# Patient Record
Sex: MALE | Race: WHITE | Employment: FULL TIME
[De-identification: names, ages, dates, MRNs, and addresses within clinical notes are randomized per-mention and may not be internally consistent; named-entity substitution may affect disease eponyms.]

---

## 2022-03-19 PROBLEM — N39.41 URGE URINARY INCONTINENCE: Status: ACTIVE | Noted: 2018-06-13

## 2022-03-20 PROBLEM — J30.89 ENVIRONMENTAL AND SEASONAL ALLERGIES: Status: ACTIVE | Noted: 2018-06-13

## 2023-05-12 DIAGNOSIS — Z11.4 ENCOUNTER FOR SCREENING FOR HIV: ICD-10-CM

## 2023-05-12 DIAGNOSIS — Z12.5 SPECIAL SCREENING FOR MALIGNANT NEOPLASM OF PROSTATE: ICD-10-CM

## 2023-05-12 DIAGNOSIS — Z00.00 LABORATORY EXAMINATION ORDERED AS PART OF A ROUTINE GENERAL MEDICAL EXAMINATION: ICD-10-CM

## 2023-05-12 DIAGNOSIS — E55.9 VITAMIN D DEFICIENCY: ICD-10-CM

## 2023-05-12 DIAGNOSIS — E78.1 PURE HYPERTRIGLYCERIDEMIA: Primary | ICD-10-CM

## 2023-05-12 DIAGNOSIS — Z11.59 NEED FOR HEPATITIS C SCREENING TEST: ICD-10-CM

## 2023-06-01 ENCOUNTER — NURSE ONLY (OUTPATIENT)
Dept: FAMILY MEDICINE CLINIC | Facility: CLINIC | Age: 48
End: 2023-06-01
Payer: COMMERCIAL

## 2023-06-01 DIAGNOSIS — E55.9 VITAMIN D DEFICIENCY: ICD-10-CM

## 2023-06-01 DIAGNOSIS — Z11.4 ENCOUNTER FOR SCREENING FOR HIV: ICD-10-CM

## 2023-06-01 DIAGNOSIS — Z12.5 SPECIAL SCREENING FOR MALIGNANT NEOPLASM OF PROSTATE: ICD-10-CM

## 2023-06-01 DIAGNOSIS — Z00.00 LABORATORY EXAMINATION ORDERED AS PART OF A ROUTINE GENERAL MEDICAL EXAMINATION: Primary | ICD-10-CM

## 2023-06-01 DIAGNOSIS — Z11.59 NEED FOR HEPATITIS C SCREENING TEST: ICD-10-CM

## 2023-06-01 DIAGNOSIS — E78.1 PURE HYPERTRIGLYCERIDEMIA: ICD-10-CM

## 2023-06-01 LAB
BASOPHILS # BLD: 0.1 K/UL (ref 0–0.2)
BASOPHILS NFR BLD: 2 % (ref 0–2)
BILIRUBIN, URINE, POC: NEGATIVE
BLOOD URINE, POC: NEGATIVE
DIFFERENTIAL METHOD BLD: NORMAL
EOSINOPHIL # BLD: 0.3 K/UL (ref 0–0.8)
EOSINOPHIL NFR BLD: 4 % (ref 0.5–7.8)
ERYTHROCYTE [DISTWIDTH] IN BLOOD BY AUTOMATED COUNT: 12.7 % (ref 11.9–14.6)
GLUCOSE URINE, POC: NEGATIVE
HCT VFR BLD AUTO: 48.9 % (ref 41.1–50.3)
HGB BLD-MCNC: 16.6 G/DL (ref 13.6–17.2)
HIV 1+2 AB+HIV1 P24 AG SERPL QL IA: NONREACTIVE
HIV 1/2 RESULT COMMENT: NORMAL
IMM GRANULOCYTES # BLD AUTO: 0 K/UL (ref 0–0.5)
IMM GRANULOCYTES NFR BLD AUTO: 0 % (ref 0–5)
KETONES, URINE, POC: ABNORMAL
LEUKOCYTE ESTERASE, URINE, POC: NEGATIVE
LYMPHOCYTES # BLD: 2.3 K/UL (ref 0.5–4.6)
LYMPHOCYTES NFR BLD: 33 % (ref 13–44)
MCH RBC QN AUTO: 30 PG (ref 26.1–32.9)
MCHC RBC AUTO-ENTMCNC: 33.9 G/DL (ref 31.4–35)
MCV RBC AUTO: 88.3 FL (ref 82–102)
MONOCYTES # BLD: 0.6 K/UL (ref 0.1–1.3)
MONOCYTES NFR BLD: 9 % (ref 4–12)
NEUTS SEG # BLD: 3.5 K/UL (ref 1.7–8.2)
NEUTS SEG NFR BLD: 52 % (ref 43–78)
NITRITE, URINE, POC: NEGATIVE
NRBC # BLD: 0 K/UL (ref 0–0.2)
PH, URINE, POC: 5.5 (ref 4.6–8)
PLATELET # BLD AUTO: 275 K/UL (ref 150–450)
PMV BLD AUTO: 10.7 FL (ref 9.4–12.3)
PROTEIN,URINE, POC: NEGATIVE
PSA SERPL-MCNC: 0.7 NG/ML
RBC # BLD AUTO: 5.54 M/UL (ref 4.23–5.6)
SPECIFIC GRAVITY, URINE, POC: 1.02 (ref 1–1.03)
URINALYSIS CLARITY, POC: CLEAR
URINALYSIS COLOR, POC: YELLOW
UROBILINOGEN, POC: NORMAL
WBC # BLD AUTO: 6.8 K/UL (ref 4.3–11.1)

## 2023-06-01 PROCEDURE — 81003 URINALYSIS AUTO W/O SCOPE: CPT | Performed by: FAMILY MEDICINE

## 2023-06-02 LAB
25(OH)D3 SERPL-MCNC: 32.6 NG/ML (ref 30–100)
ALBUMIN SERPL-MCNC: 4.2 G/DL (ref 3.5–5)
ALBUMIN/GLOB SERPL: 1.5 (ref 0.4–1.6)
ALP SERPL-CCNC: 82 U/L (ref 50–136)
ALT SERPL-CCNC: 52 U/L (ref 12–65)
ANION GAP SERPL CALC-SCNC: 6 MMOL/L (ref 2–11)
AST SERPL-CCNC: 25 U/L (ref 15–37)
BILIRUB SERPL-MCNC: 0.6 MG/DL (ref 0.2–1.1)
BUN SERPL-MCNC: 22 MG/DL (ref 6–23)
CALCIUM SERPL-MCNC: 9.6 MG/DL (ref 8.3–10.4)
CHLORIDE SERPL-SCNC: 107 MMOL/L (ref 101–110)
CHOLEST SERPL-MCNC: 177 MG/DL
CO2 SERPL-SCNC: 27 MMOL/L (ref 21–32)
CREAT SERPL-MCNC: 0.9 MG/DL (ref 0.8–1.5)
GLOBULIN SER CALC-MCNC: 2.8 G/DL (ref 2.8–4.5)
GLUCOSE SERPL-MCNC: 91 MG/DL (ref 65–100)
HCV AB SER QL: NONREACTIVE
HDLC SERPL-MCNC: 59 MG/DL (ref 40–60)
HDLC SERPL: 3
LDLC SERPL CALC-MCNC: 107.2 MG/DL
POTASSIUM SERPL-SCNC: 4.3 MMOL/L (ref 3.5–5.1)
PROT SERPL-MCNC: 7 G/DL (ref 6.3–8.2)
SODIUM SERPL-SCNC: 140 MMOL/L (ref 133–143)
TRIGL SERPL-MCNC: 54 MG/DL (ref 35–150)
TSH, 3RD GENERATION: 0.64 UIU/ML (ref 0.36–3.74)
VLDLC SERPL CALC-MCNC: 10.8 MG/DL (ref 6–23)

## 2023-06-07 ENCOUNTER — OFFICE VISIT (OUTPATIENT)
Dept: FAMILY MEDICINE CLINIC | Facility: CLINIC | Age: 48
End: 2023-06-07
Payer: COMMERCIAL

## 2023-06-07 VITALS
SYSTOLIC BLOOD PRESSURE: 130 MMHG | BODY MASS INDEX: 23.79 KG/M2 | HEIGHT: 68 IN | DIASTOLIC BLOOD PRESSURE: 80 MMHG | WEIGHT: 157 LBS

## 2023-06-07 DIAGNOSIS — Z13.31 SCREENING FOR DEPRESSION: ICD-10-CM

## 2023-06-07 DIAGNOSIS — Z12.11 SPECIAL SCREENING FOR MALIGNANT NEOPLASMS, COLON: ICD-10-CM

## 2023-06-07 DIAGNOSIS — Z00.00 ROUTINE GENERAL MEDICAL EXAMINATION AT A HEALTH CARE FACILITY: Primary | ICD-10-CM

## 2023-06-07 DIAGNOSIS — G47.01 INSOMNIA DUE TO MEDICAL CONDITION: ICD-10-CM

## 2023-06-07 DIAGNOSIS — M19.90 ARTHRITIS: ICD-10-CM

## 2023-06-07 DIAGNOSIS — L91.8 SKIN TAG: ICD-10-CM

## 2023-06-07 PROCEDURE — 99396 PREV VISIT EST AGE 40-64: CPT | Performed by: FAMILY MEDICINE

## 2023-06-07 PROCEDURE — 11200 RMVL SKIN TAGS UP TO&INC 15: CPT | Performed by: FAMILY MEDICINE

## 2023-06-07 RX ORDER — CELECOXIB 200 MG/1
200 CAPSULE ORAL DAILY
Qty: 90 CAPSULE | Refills: 3 | Status: SHIPPED | OUTPATIENT
Start: 2023-06-07

## 2023-06-07 RX ORDER — AMITRIPTYLINE HYDROCHLORIDE 25 MG/1
25 TABLET, FILM COATED ORAL NIGHTLY
Qty: 90 TABLET | Refills: 3 | Status: SHIPPED | OUTPATIENT
Start: 2023-06-07

## 2023-06-07 SDOH — ECONOMIC STABILITY: FOOD INSECURITY: WITHIN THE PAST 12 MONTHS, YOU WORRIED THAT YOUR FOOD WOULD RUN OUT BEFORE YOU GOT MONEY TO BUY MORE.: NEVER TRUE

## 2023-06-07 SDOH — ECONOMIC STABILITY: INCOME INSECURITY: HOW HARD IS IT FOR YOU TO PAY FOR THE VERY BASICS LIKE FOOD, HOUSING, MEDICAL CARE, AND HEATING?: NOT HARD AT ALL

## 2023-06-07 SDOH — ECONOMIC STABILITY: FOOD INSECURITY: WITHIN THE PAST 12 MONTHS, THE FOOD YOU BOUGHT JUST DIDN'T LAST AND YOU DIDN'T HAVE MONEY TO GET MORE.: NEVER TRUE

## 2023-06-07 SDOH — ECONOMIC STABILITY: HOUSING INSECURITY
IN THE LAST 12 MONTHS, WAS THERE A TIME WHEN YOU DID NOT HAVE A STEADY PLACE TO SLEEP OR SLEPT IN A SHELTER (INCLUDING NOW)?: NO

## 2023-06-07 ASSESSMENT — PATIENT HEALTH QUESTIONNAIRE - PHQ9
SUM OF ALL RESPONSES TO PHQ QUESTIONS 1-9: 8
3. TROUBLE FALLING OR STAYING ASLEEP: 1
SUM OF ALL RESPONSES TO PHQ QUESTIONS 1-9: 8
6. FEELING BAD ABOUT YOURSELF - OR THAT YOU ARE A FAILURE OR HAVE LET YOURSELF OR YOUR FAMILY DOWN: 1
4. FEELING TIRED OR HAVING LITTLE ENERGY: 2
10. IF YOU CHECKED OFF ANY PROBLEMS, HOW DIFFICULT HAVE THESE PROBLEMS MADE IT FOR YOU TO DO YOUR WORK, TAKE CARE OF THINGS AT HOME, OR GET ALONG WITH OTHER PEOPLE: 1
SUM OF ALL RESPONSES TO PHQ QUESTIONS 1-9: 8
5. POOR APPETITE OR OVEREATING: 0
1. LITTLE INTEREST OR PLEASURE IN DOING THINGS: 1
2. FEELING DOWN, DEPRESSED OR HOPELESS: 2
SUM OF ALL RESPONSES TO PHQ9 QUESTIONS 1 & 2: 3
SUM OF ALL RESPONSES TO PHQ QUESTIONS 1-9: 8
9. THOUGHTS THAT YOU WOULD BE BETTER OFF DEAD, OR OF HURTING YOURSELF: 0
7. TROUBLE CONCENTRATING ON THINGS, SUCH AS READING THE NEWSPAPER OR WATCHING TELEVISION: 1
8. MOVING OR SPEAKING SO SLOWLY THAT OTHER PEOPLE COULD HAVE NOTICED. OR THE OPPOSITE, BEING SO FIGETY OR RESTLESS THAT YOU HAVE BEEN MOVING AROUND A LOT MORE THAN USUAL: 0

## 2023-06-07 ASSESSMENT — ENCOUNTER SYMPTOMS
ROS SKIN COMMENTS: SKIN TAG
WHEEZING: 0
DIARRHEA: 0
COUGH: 0
VOMITING: 0
CONSTIPATION: 0
SORE THROAT: 0
SHORTNESS OF BREATH: 0
NAUSEA: 0
ABDOMINAL PAIN: 0

## 2023-06-07 NOTE — PROGRESS NOTES
C-Reactive Protein [id number] Custom]   - Future Order    Uric Acid [10598 Custom]   - Future Order    Rheumatoid Factor [20554 Custom]   - Future Order    CCP Antibodies, IGG/IGA [09967 Custom]      JUNIOR, Direct, w/Reflex [51648 CPT(R)]      Sedimentation Rate [64084 Custom]      C-Reactive Protein [89505 Custom]      Uric Acid [27660 Custom]      Rheumatoid Factor [66358 Custom]      05384 - Venipuncture [41121 CPT(R)]           Special screening for malignant neoplasms, colon        [facility] - Colonoscopy [SLP479 Custom]           Screening for depression             Skin tag        98541 - OR DESTRUCTION BENIGN LESIONS UP TO 14 [03769 CPT(R)]                       Diagnosis Orders   1. Routine general medical examination at a health care facility        2. Insomnia due to medical condition  amitriptyline (ELAVIL) 25 MG tablet      3. Arthritis  celecoxib (CELEBREX) 200 MG capsule    Sedimentation Rate    CCP Antibodies, IGG/IGA    JUNIOR, Direct, w/Reflex    C-Reactive Protein    Uric Acid    Rheumatoid Factor    CCP Antibodies, IGG/IGA    JUNIOR, Direct, w/Reflex    Sedimentation Rate    C-Reactive Protein    Uric Acid    Rheumatoid Factor    05116 - Venipuncture      4. Special screening for malignant neoplasms, colon  [address] - Colonoscopy      5. Screening for depression        6. Skin tag  18399 - OR DESTRUCTION BENIGN LESIONS UP TO 14      , Serafina Koyanagi was seen today for annual exam and discuss labs. Diagnoses and all orders for this visit:    Routine general medical examination at a health care facility    Insomnia due to medical condition  -     amitriptyline (ELAVIL) 25 MG tablet; Take 1 tablet by mouth nightly TAKE 1-2 TABLETS AT BEDTIME    Arthritis  -     celecoxib (CELEBREX) 200 MG capsule; Take 1 capsule by mouth daily  -     Sedimentation Rate; Future  -     CCP Antibodies, IGG/IGA; Future  -     JUNIOR, Direct, w/Reflex;  Future  -     C-Reactive Protein;

## 2023-06-08 LAB
CCP IGA+IGG SERPL IA-ACNC: 2 UNITS (ref 0–19)
CRP SERPL-MCNC: <0.3 MG/DL (ref 0–0.9)
ERYTHROCYTE [SEDIMENTATION RATE] IN BLOOD: <1 MM/HR (ref 0–20)
RHEUMATOID FACT SER QL LA: NEGATIVE
URATE SERPL-MCNC: 5.2 MG/DL (ref 2.6–6)

## 2023-06-09 LAB
ANA SER QL: POSITIVE
CENTROMERE B AB SER-ACNC: <0.2 AI (ref 0–0.9)
CHROMATIN AB SERPL-ACNC: <0.2 AI (ref 0–0.9)
DSDNA AB SER-ACNC: <1 IU/ML (ref 0–9)
ENA JO1 AB SER-ACNC: <0.2 AI (ref 0–0.9)
ENA RNP AB SER-ACNC: 1 AI (ref 0–0.9)
ENA SCL70 AB SER-ACNC: <0.2 AI (ref 0–0.9)
ENA SM AB SER-ACNC: <0.2 AI (ref 0–0.9)
ENA SS-A AB SER-ACNC: <0.2 AI (ref 0–0.9)
ENA SS-B AB SER-ACNC: <0.2 AI (ref 0–0.9)
Lab: ABNORMAL

## 2023-06-27 ENCOUNTER — TELEMEDICINE (OUTPATIENT)
Dept: FAMILY MEDICINE CLINIC | Facility: CLINIC | Age: 48
End: 2023-06-27
Payer: COMMERCIAL

## 2023-06-27 DIAGNOSIS — R53.83 OTHER FATIGUE: ICD-10-CM

## 2023-06-27 DIAGNOSIS — M19.90 ARTHRITIS: Primary | ICD-10-CM

## 2023-06-27 PROCEDURE — 99443 PR PHYS/QHP TELEPHONE EVALUATION 21-30 MIN: CPT | Performed by: FAMILY MEDICINE

## 2023-06-27 ASSESSMENT — PATIENT HEALTH QUESTIONNAIRE - PHQ9
1. LITTLE INTEREST OR PLEASURE IN DOING THINGS: 0
SUM OF ALL RESPONSES TO PHQ QUESTIONS 1-9: 0
SUM OF ALL RESPONSES TO PHQ QUESTIONS 1-9: 0
SUM OF ALL RESPONSES TO PHQ9 QUESTIONS 1 & 2: 0
2. FEELING DOWN, DEPRESSED OR HOPELESS: 0
SUM OF ALL RESPONSES TO PHQ QUESTIONS 1-9: 0
SUM OF ALL RESPONSES TO PHQ QUESTIONS 1-9: 0

## 2023-06-27 ASSESSMENT — ENCOUNTER SYMPTOMS
NAUSEA: 0
SHORTNESS OF BREATH: 0
VOMITING: 0

## 2023-07-05 DIAGNOSIS — R53.83 OTHER FATIGUE: ICD-10-CM

## 2023-07-09 LAB
TESTOST FREE SERPL-MCNC: 4.4 PG/ML (ref 6.8–21.5)
TESTOST SERPL-MCNC: 243 NG/DL (ref 264–916)

## 2023-07-17 ENCOUNTER — TELEMEDICINE (OUTPATIENT)
Dept: FAMILY MEDICINE CLINIC | Facility: CLINIC | Age: 48
End: 2023-07-17
Payer: COMMERCIAL

## 2023-07-17 DIAGNOSIS — R79.89 LOW TESTOSTERONE IN MALE: Primary | ICD-10-CM

## 2023-07-17 PROCEDURE — 99442 PR PHYS/QHP TELEPHONE EVALUATION 11-20 MIN: CPT | Performed by: FAMILY MEDICINE

## 2023-07-17 RX ORDER — TESTOSTERONE 20.25 MG/1.25G
GEL TOPICAL
Qty: 88 G | Refills: 5 | Status: SHIPPED | OUTPATIENT
Start: 2023-07-17 | End: 2023-08-17

## 2023-07-17 NOTE — PROGRESS NOTES
PROGRESS NOTE    SUBJECTIVE:   Aislinn Linton is a 53 y.o. male seen for a follow up visit regarding the following chief complaint:     Chief Complaint   Patient presents with    Discuss Labs           HPI  Patient is doing a phone call visit to go over his lab results without any new complaintsAurelio Hickman Ace is a 53 y.o. male evaluated via telephone on 7/17/2023 for Discuss Labs  . Total Time: minutes: 11-20 minutes    Aislinn Linton was evaluated through a synchronous (real-time) audio encounter. Patient identification was verified at the start of the visit. He (or guardian if applicable) is aware that this is a billable service, which includes applicable co-pays. This visit was conducted with the patient's (and/or legal guardian's) verbal consent. He has not had a related appointment within my department in the past 7 days or scheduled within the next 24 hours. The patient was located at Home: [place] [id number]. The provider was located at [address]): [address] [place] [address] Dr Hubert Mcelroy [address] [facility],  [address] [place] [address]. Note: not billable if this call serves to triage the patient into an appointment for the relevant concern       Past Medical History, Past Surgical History, Family history, Social History, and Medications were all reviewed with the patient today and updated as necessary. Current Outpatient Medications   Medication Sig Dispense Refill    testosterone (ANDROGEL) 20.25 MG/1.25GM (1.62%) GEL gel 2 pumps daily 88 g 5    amitriptyline (ELAVIL) 25 MG tablet Take 1 tablet by mouth nightly TAKE 1-2 TABLETS AT BEDTIME 90 tablet 3    celecoxib (CELEBREX) 200 MG capsule Take 1 capsule by mouth daily 90 capsule 3     No current facility-administered medications for this visit.      No Known Allergies  Patient Active Problem List   Diagnosis    Insomnia    Urge urinary incontinence    Environmental and seasonal allergies     Past Medical History:   Diagnosis Date    Insomnia      Past

## 2023-08-23 ENCOUNTER — TELEMEDICINE (OUTPATIENT)
Dept: SURGERY | Age: 48
End: 2023-08-23
Payer: COMMERCIAL

## 2023-08-23 ENCOUNTER — PREP FOR PROCEDURE (OUTPATIENT)
Dept: SURGERY | Age: 48
End: 2023-08-23

## 2023-08-23 DIAGNOSIS — Z12.11 COLON CANCER SCREENING: Primary | ICD-10-CM

## 2023-08-23 DIAGNOSIS — Z12.11 SPECIAL SCREENING FOR MALIGNANT NEOPLASMS, COLON: ICD-10-CM

## 2023-08-23 PROCEDURE — 99203 OFFICE O/P NEW LOW 30 MIN: CPT | Performed by: SURGERY

## 2023-08-23 NOTE — PROGRESS NOTES
Roberto Urrutia MD   Bariatric & Advanced Laparoscopic Surgery & Endoscopy  [address] [facility], [address] [place] [address], [address] [place] [address] 7  Phone [phone]   Fax [phone]      Date of visit: 2023      Primary/Requesting provider: Rosemarie Celis DO         Name: Alvin Saeed      MRN: [id number]       : 1975       Age: 53 y.o. Sex: male        PCP: Rosemarie Celis DO     CC:  No chief complaint on file. Alvin Saeed is a 53 y.o. male who was seen by synchronous (real-time) audio-video technology on 2023. I was in the officewhile conducting this encounter. Consent:  He and/or his healthcare decision maker is aware that this patient-initiated Telehealth encounter is a billable service, with coverage as determined by his insurance carrier. He is aware that he may receive a bill and has provided verbal consent to proceed: Yes    This virtual visit was conducted by My Chart.  and first and last name verified. HPI:     Alvin Saeed is a 53 y.o. male was referred here for a colonoscopy by PCP. Previous colonoscopy NO   Family hx of colon cancer NO     BRBPR NO   Melena NO   Loss of appetite NO   Weight loss NO     Change in bowel habits NO     Smoking - denies  Blood thinners - denies    PMH:    Past Medical History:   Diagnosis Date    Insomnia        PSH:    Past Surgical History:   Procedure Laterality Date    TURP  2016    VASECTOMY         MEDS:    Current Outpatient Medications   Medication Instructions    amitriptyline (ELAVIL) 25 mg, Oral, NIGHTLY, TAKE 1-2 TABLETS AT BEDTIME    celecoxib (CELEBREX) 200 mg, Oral, DAILY    testosterone (ANDROGEL) 20.25 MG/1.25GM (1.62%) GEL gel 2 pumps daily        ALLERGIES:      No Known Allergies    SH:    Social History     Tobacco Use    Smoking status: Never     Passive exposure: Never    Smokeless tobacco: Never   Vaping Use    Vaping Use: Never used   Substance Use Topics    Alcohol use:  Yes

## 2023-09-22 PROBLEM — Z12.11 SPECIAL SCREENING FOR MALIGNANT NEOPLASMS, COLON: Status: RESOLVED | Noted: 2023-08-23 | Resolved: 2023-09-22

## 2023-09-25 ENCOUNTER — TELEPHONE (OUTPATIENT)
Dept: SURGERY | Age: 48
End: 2023-09-25

## 2023-09-25 NOTE — PROGRESS NOTES
PAT assessment postponed due to patient testing positive for COVID on 9/19/23. Pt states will call Dr Maninder Lange office to reschedule.

## 2023-09-26 PROBLEM — Z12.11 SPECIAL SCREENING FOR MALIGNANT NEOPLASMS, COLON: Status: ACTIVE | Noted: 2023-08-23

## 2023-09-27 ENCOUNTER — NURSE ONLY (OUTPATIENT)
Dept: FAMILY MEDICINE CLINIC | Facility: CLINIC | Age: 48
End: 2023-09-27
Payer: COMMERCIAL

## 2023-09-27 DIAGNOSIS — R79.89 LOW TESTOSTERONE IN MALE: ICD-10-CM

## 2023-09-27 PROCEDURE — 36415 COLL VENOUS BLD VENIPUNCTURE: CPT | Performed by: FAMILY MEDICINE

## 2023-10-04 LAB
TESTOST FREE SERPL-MCNC: 9.6 PG/ML (ref 6.8–21.5)
TESTOST SERPL-MCNC: 610 NG/DL (ref 264–916)

## 2023-10-24 ENCOUNTER — OFFICE VISIT (OUTPATIENT)
Dept: FAMILY MEDICINE CLINIC | Facility: CLINIC | Age: 48
End: 2023-10-24
Payer: COMMERCIAL

## 2023-10-24 VITALS
WEIGHT: 158 LBS | HEART RATE: 88 BPM | BODY MASS INDEX: 23.95 KG/M2 | HEIGHT: 68 IN | DIASTOLIC BLOOD PRESSURE: 82 MMHG | SYSTOLIC BLOOD PRESSURE: 118 MMHG

## 2023-10-24 DIAGNOSIS — M19.90 ARTHRITIS: ICD-10-CM

## 2023-10-24 DIAGNOSIS — G47.01 INSOMNIA DUE TO MEDICAL CONDITION: ICD-10-CM

## 2023-10-24 DIAGNOSIS — R79.89 LOW TESTOSTERONE IN MALE: ICD-10-CM

## 2023-10-24 PROCEDURE — 99213 OFFICE O/P EST LOW 20 MIN: CPT | Performed by: FAMILY MEDICINE

## 2023-10-24 RX ORDER — CELECOXIB 200 MG/1
200 CAPSULE ORAL DAILY
Qty: 90 CAPSULE | Refills: 3 | Status: SHIPPED | OUTPATIENT
Start: 2023-10-24

## 2023-10-24 RX ORDER — TESTOSTERONE 20.25 MG/1.25G
GEL TOPICAL
Qty: 88 G | Refills: 5 | Status: SHIPPED | OUTPATIENT
Start: 2023-10-24 | End: 2023-11-24

## 2023-10-24 RX ORDER — AMITRIPTYLINE HYDROCHLORIDE 25 MG/1
25 TABLET, FILM COATED ORAL NIGHTLY
Qty: 90 TABLET | Refills: 3 | Status: SHIPPED | OUTPATIENT
Start: 2023-10-24

## 2023-10-24 ASSESSMENT — ENCOUNTER SYMPTOMS
VOMITING: 0
SHORTNESS OF BREATH: 0
NAUSEA: 0

## 2023-10-24 NOTE — PROGRESS NOTES
PROGRESS NOTE    SUBJECTIVE:   Sophia Delaney is a 53 y.o. male seen for a follow up visit regarding the following chief complaint:     Chief Complaint   Patient presents with    Follow-up     Testosterone lab follow up           HPI  Patient presents office today follow-up of his lab work and refill on his medication without any new complaints    Past Medical History, Past Surgical History, Family history, Social History, and Medications were all reviewed with the patient today and updated as necessary. Current Outpatient Medications   Medication Sig Dispense Refill    testosterone (ANDROGEL) 20.25 MG/1.25GM (1.62%) GEL gel 2 pumps daily 88 g 5    celecoxib (CELEBREX) 200 MG capsule Take 1 capsule by mouth daily 90 capsule 3    amitriptyline (ELAVIL) 25 MG tablet Take 1 tablet by mouth nightly TAKE 1-2 TABLETS AT BEDTIME 90 tablet 3     No current facility-administered medications for this visit. No Known Allergies  Patient Active Problem List   Diagnosis    Insomnia    Urge urinary incontinence    Environmental and seasonal allergies    Special screening for malignant neoplasms, colon     Past Medical History:   Diagnosis Date    COVID-19     cough, congestion; sinus pressure, fatigue    Insomnia      Past Surgical History:   Procedure Laterality Date    TURP  05/2016    VASECTOMY       Family History   Problem Relation Age of Onset    No Known Problems Father     Thyroid Disease Mother     Asthma Mother     No Known Problems Brother      Social History     Tobacco Use    Smoking status: Never     Passive exposure: Never    Smokeless tobacco: Never   Substance Use Topics    Alcohol use: Yes     Alcohol/week: 1.0 standard drink of alcohol         Review of Systems   Constitutional:  Negative for fatigue and fever. Respiratory:  Negative for shortness of breath. Cardiovascular:  Negative for chest pain. Gastrointestinal:  Negative for nausea and vomiting.          OBJECTIVE:  /82 (Site: Left

## 2023-10-26 PROBLEM — Z12.11 SPECIAL SCREENING FOR MALIGNANT NEOPLASMS, COLON: Status: RESOLVED | Noted: 2023-08-23 | Resolved: 2023-10-26

## 2023-11-06 PROBLEM — Z12.11 SPECIAL SCREENING FOR MALIGNANT NEOPLASMS, COLON: Status: ACTIVE | Noted: 2023-08-23

## 2023-11-13 ENCOUNTER — TELEPHONE (OUTPATIENT)
Dept: SURGERY | Age: 48
End: 2023-11-13

## 2023-11-13 NOTE — TELEPHONE ENCOUNTER
[facility] scheduled for:       *Has patient picked up medications Miralax, Dulcolax, Gatorade or drink of choice-not red)? Yes       *Discussed instructions for how to take these and instructions for the week prior to your procedure? Yes      *Discussed instructions for the next couple of days? Yes       *Patient reminded of location of procedure and time of arrival?     Yes      *Who will be bringing patient and staying at the hospital with them during your procedure? spouse      *Informed patient that they should receive a call from the hospital as well to pre-register them for this procedure? Yes       *Informed them of contact info if needed to cancel or reschedule this procedure?      Yes

## 2023-11-15 NOTE — PROGRESS NOTES
Patient verified name, , and procedure. Type: 1a; abbreviated assessment per anesthesia guidelines    Labs per anesthesia: none    Instructed pt that they will be notified the day before their procedure by the GI Lab for time of arrival if their procedure is [facility] and Pre-op for [place] cases. Arrival times should be called by 5 pm. If no phone is received the patient should contact their respective hospital. The GI lab telephone number is [phone] and ES Pre-op is [phone]. Follow diet and prep instructions per office including NPO status. If patient has NOT received instructions from office patient is advised to call surgeon office, verbalizes understanding. Bath or shower the night before and the am of surgery with non-moisturizing soap. No lotions, oils, powders, cologne on skin. No make up, eye make up or jewelry. Wear loose fitting comfortable, clean clothing. Must have adult present in building the entire time . Medications for the day of procedure testosterone gel, patient to hold celebrex per anesthesia guidelines.

## 2023-11-17 ENCOUNTER — ANESTHESIA (OUTPATIENT)
Dept: ENDOSCOPY | Age: 48
End: 2023-11-17
Payer: COMMERCIAL

## 2023-11-17 ENCOUNTER — HOSPITAL ENCOUNTER (OUTPATIENT)
Age: 48
Setting detail: OUTPATIENT SURGERY
Discharge: HOME | End: 2023-11-17
Attending: SURGERY | Admitting: SURGERY
Payer: COMMERCIAL

## 2023-11-17 ENCOUNTER — ANESTHESIA EVENT (OUTPATIENT)
Dept: ENDOSCOPY | Age: 48
End: 2023-11-17
Payer: COMMERCIAL

## 2023-11-17 VITALS
HEART RATE: 75 BPM | WEIGHT: 151.6 LBS | OXYGEN SATURATION: 100 % | DIASTOLIC BLOOD PRESSURE: 61 MMHG | HEIGHT: 68 IN | BODY MASS INDEX: 22.97 KG/M2 | SYSTOLIC BLOOD PRESSURE: 105 MMHG | TEMPERATURE: 98 F | RESPIRATION RATE: 18 BRPM

## 2023-11-17 DIAGNOSIS — Z12.11 SPECIAL SCREENING FOR MALIGNANT NEOPLASMS, COLON: ICD-10-CM

## 2023-11-17 PROCEDURE — 3700000001 HC ADD 15 MINUTES (ANESTHESIA): Performed by: SURGERY

## 2023-11-17 PROCEDURE — 3609027000 HC COLONOSCOPY: Performed by: SURGERY

## 2023-11-17 PROCEDURE — 2580000003 HC RX 258: Performed by: NURSE ANESTHETIST, CERTIFIED REGISTERED

## 2023-11-17 PROCEDURE — 45378 DIAGNOSTIC COLONOSCOPY: CPT | Performed by: SURGERY

## 2023-11-17 PROCEDURE — 7100000010 HC PHASE II RECOVERY - FIRST 15 MIN: Performed by: SURGERY

## 2023-11-17 PROCEDURE — 2580000003 HC RX 258: Performed by: ANESTHESIOLOGY

## 2023-11-17 PROCEDURE — 7100000011 HC PHASE II RECOVERY - ADDTL 15 MIN: Performed by: SURGERY

## 2023-11-17 PROCEDURE — 6360000002 HC RX W HCPCS: Performed by: NURSE ANESTHETIST, CERTIFIED REGISTERED

## 2023-11-17 PROCEDURE — 2709999900 HC NON-CHARGEABLE SUPPLY: Performed by: SURGERY

## 2023-11-17 PROCEDURE — 3700000000 HC ANESTHESIA ATTENDED CARE: Performed by: SURGERY

## 2023-11-17 PROCEDURE — 2500000003 HC RX 250 WO HCPCS: Performed by: NURSE ANESTHETIST, CERTIFIED REGISTERED

## 2023-11-17 RX ORDER — MIDAZOLAM HYDROCHLORIDE 2 MG/2ML
2 INJECTION, SOLUTION INTRAMUSCULAR; INTRAVENOUS
Status: DISCONTINUED | OUTPATIENT
Start: 2023-11-17 | End: 2023-11-17 | Stop reason: HOSPADM

## 2023-11-17 RX ORDER — LIDOCAINE HYDROCHLORIDE 10 MG/ML
1 INJECTION, SOLUTION INFILTRATION; PERINEURAL
Status: DISCONTINUED | OUTPATIENT
Start: 2023-11-17 | End: 2023-11-17 | Stop reason: HOSPADM

## 2023-11-17 RX ORDER — PROPOFOL 10 MG/ML
INJECTION, EMULSION INTRAVENOUS PRN
Status: DISCONTINUED | OUTPATIENT
Start: 2023-11-17 | End: 2023-11-17 | Stop reason: SDUPTHER

## 2023-11-17 RX ORDER — SODIUM CHLORIDE, SODIUM LACTATE, POTASSIUM CHLORIDE, CALCIUM CHLORIDE 600; 310; 30; 20 MG/100ML; MG/100ML; MG/100ML; MG/100ML
INJECTION, SOLUTION INTRAVENOUS CONTINUOUS PRN
Status: DISCONTINUED | OUTPATIENT
Start: 2023-11-17 | End: 2023-11-17 | Stop reason: SDUPTHER

## 2023-11-17 RX ORDER — SODIUM CHLORIDE, SODIUM LACTATE, POTASSIUM CHLORIDE, CALCIUM CHLORIDE 600; 310; 30; 20 MG/100ML; MG/100ML; MG/100ML; MG/100ML
INJECTION, SOLUTION INTRAVENOUS CONTINUOUS
Status: DISCONTINUED | OUTPATIENT
Start: 2023-11-17 | End: 2023-11-17 | Stop reason: HOSPADM

## 2023-11-17 RX ORDER — LIDOCAINE HYDROCHLORIDE 20 MG/ML
INJECTION, SOLUTION EPIDURAL; INFILTRATION; INTRACAUDAL; PERINEURAL PRN
Status: DISCONTINUED | OUTPATIENT
Start: 2023-11-17 | End: 2023-11-17 | Stop reason: SDUPTHER

## 2023-11-17 RX ADMIN — PROPOFOL 70 MG: 10 INJECTION, EMULSION INTRAVENOUS at 09:48

## 2023-11-17 RX ADMIN — PROPOFOL 160 MCG/KG/MIN: 10 INJECTION, EMULSION INTRAVENOUS at 09:49

## 2023-11-17 RX ADMIN — SODIUM CHLORIDE, SODIUM LACTATE, POTASSIUM CHLORIDE, AND CALCIUM CHLORIDE: 600; 310; 30; 20 INJECTION, SOLUTION INTRAVENOUS at 09:41

## 2023-11-17 RX ADMIN — SODIUM CHLORIDE, POTASSIUM CHLORIDE, SODIUM LACTATE AND CALCIUM CHLORIDE: 600; 310; 30; 20 INJECTION, SOLUTION INTRAVENOUS at 09:02

## 2023-11-17 RX ADMIN — LIDOCAINE HYDROCHLORIDE 50 MG: 20 INJECTION, SOLUTION EPIDURAL; INFILTRATION; INTRACAUDAL; PERINEURAL at 09:48

## 2023-11-17 ASSESSMENT — PAIN - FUNCTIONAL ASSESSMENT: PAIN_FUNCTIONAL_ASSESSMENT: 0-10

## 2023-11-17 NOTE — OP NOTE
Colonoscopy Procedure Note    Date of procedure: 2023      Name: Jose Gaffney      MRN: [id number]       : 1975       Age: 53 y.o. Sex: male    Preoperative Diagnosis: 1. CRC screening          Postoperative Diagnosis: 1. same      2. Normal colon    Procedure(s):  COLORECTAL CANCER SCREENING, NOT HIGH RISK/ BMI 24     Procedure in Detail:  Informed consent was obtained for the procedure. The patient was placed in the left lateral decubitus position and sedation was induced by anesthesia. The [id number] was inserted into the rectum and advanced under direct vision to the cecum, which was identified by the ileocecal valve and appendiceal orifice. The quality of the colonic preparation was good. A careful inspection was made as the colonoscope was withdrawn, including a retroflexed view of the rectum; findings and interventions are described below. Appropriate photodocumentation was obtained. Rectum:   normal  Sigmoid:   normal  Descending Colon:   normal  Transverse Colon:   normal  Ascending Colon:   normal  Cecum:   normal      Specimens: No specimens were collected. * No specimens in log *     Complications: None; patient tolerated the procedure well. EBL - minimal    Recommendations:   - For colon cancer screening in this average-risk patient, colonoscopy may be repeated in 10 years.      Signed By: Alcides Caldera MD  [address] [facility] - Bariatric & Minimally Invasive Surgery  2023 10:08 AM

## 2023-11-17 NOTE — ANESTHESIA POSTPROCEDURE EVALUATION
Department of Anesthesiology  Postprocedure Note    Patient: Kayleen Tobar  MRN: [id number]  YOB: 1975  Date of evaluation: 11/17/2023      Procedure Summary     Date: 11/17/23 Room / Location: [facility] ENDO 01 / [facility] ENDOSCOPY    Anesthesia Start: 6183 Anesthesia Stop: 1009    Procedure: COLORECTAL CANCER SCREENING, NOT HIGH RISK/ BMI 24 (Lower GI Region) Diagnosis:       Special screening for malignant neoplasms, colon      (Special screening for malignant neoplasms, colon [Z12.11])    Surgeons: Evelin Mendoza MD Responsible Provider: Yashira Reynolds MD    Anesthesia Type: TIVA ASA Status: 2          Anesthesia Type: No value filed.     Reza Phase I:      Reza Phase II: Reza Score: 9      Anesthesia Post Evaluation    Patient location during evaluation: PACU  Patient participation: complete - patient participated  Level of consciousness: awake  Airway patency: patent  Nausea & Vomiting: no nausea and no vomiting  Complications: no  Cardiovascular status: blood pressure returned to baseline  Respiratory status: acceptable  Hydration status: euvolemic  Comments: --------------------            11/17/23               1035     --------------------   BP:       105/61     Pulse:      75       Resp:       18       Temp:                SpO2:      100%     --------------------    Pain management: adequate

## 2023-12-06 PROBLEM — Z12.11 SPECIAL SCREENING FOR MALIGNANT NEOPLASMS, COLON: Status: RESOLVED | Noted: 2023-08-23 | Resolved: 2023-12-06

## 2024-02-28 DIAGNOSIS — G47.01 INSOMNIA DUE TO MEDICAL CONDITION: ICD-10-CM

## 2024-02-29 RX ORDER — AMITRIPTYLINE HYDROCHLORIDE 25 MG/1
TABLET, FILM COATED ORAL
Qty: 180 TABLET | Refills: 0 | Status: SHIPPED | OUTPATIENT
Start: 2024-02-29

## 2024-04-03 ENCOUNTER — NURSE ONLY (OUTPATIENT)
Dept: FAMILY MEDICINE CLINIC | Facility: CLINIC | Age: 49
End: 2024-04-03
Payer: COMMERCIAL

## 2024-04-03 DIAGNOSIS — R79.89 LOW TESTOSTERONE IN MALE: ICD-10-CM

## 2024-04-03 LAB
GRANS ABSOLUTE, POC: 4.8 K/UL
GRANULOCYTES %, POC: 62.9 %
HEMATOCRIT, POC: 46.6 %
HEMOGLOBIN, POC: 15.5 G/DL
LYMPHOCYTE %, POC: 29.8 %
LYMPHS ABSOLUTE, POC: 2.3 K/UL
MCH, POC: 30.3 PG (ref 20–?)
MCHC, POC: 33.3
MCV, POC: 91.1
MONOCYTE %, POC: 7.3 %
MONOCYTE, ABSOLUTE POC: 0.6 K/UL
MPV, POC: 7.9 FL
PLATELET COUNT, POC: 268 K/UL
RBC, POC: 5.12 M/UL
RDW, POC: 11.6 %
WBC, POC: 7.7 K/UL

## 2024-04-03 PROCEDURE — 36415 COLL VENOUS BLD VENIPUNCTURE: CPT | Performed by: FAMILY MEDICINE

## 2024-04-03 PROCEDURE — 85025 COMPLETE CBC W/AUTO DIFF WBC: CPT | Performed by: FAMILY MEDICINE

## 2024-04-04 LAB — PSA SERPL-MCNC: 0.6 NG/ML

## 2024-04-05 LAB
TESTOST FREE SERPL-MCNC: 7.3 PG/ML (ref 6.8–21.5)
TESTOST SERPL-MCNC: 523 NG/DL (ref 264–916)

## 2024-04-12 ENCOUNTER — TELEMEDICINE (OUTPATIENT)
Dept: FAMILY MEDICINE CLINIC | Facility: CLINIC | Age: 49
End: 2024-04-12
Payer: COMMERCIAL

## 2024-04-12 DIAGNOSIS — R79.89 LOW TESTOSTERONE IN MALE: ICD-10-CM

## 2024-04-12 PROCEDURE — 99442 PR PHYS/QHP TELEPHONE EVALUATION 11-20 MIN: CPT | Performed by: FAMILY MEDICINE

## 2024-04-12 RX ORDER — TESTOSTERONE 20.25 MG/1.25G
GEL TOPICAL
Qty: 88 G | Refills: 5 | Status: SHIPPED | OUTPATIENT
Start: 2024-04-12 | End: 2024-09-30

## 2024-04-12 NOTE — PROGRESS NOTES
PROGRESS NOTE    SUBJECTIVE:   Aurelio Dukes is a 49 y.o. male seen for a follow up visit regarding the following chief complaint:     Chief Complaint   Patient presents with    Follow-up           HPI patient is doing a phone call visit to go over his lab results without any new complaintsAurelio Dukes is a 49 y.o. male evaluated via telephone on 4/12/2024 for Follow-up  .        Total Time: minutes: 11-20 minutes    Aurelio Dueks was evaluated through a synchronous (real-time) audio encounter. Patient identification was verified at the start of the visit. He (or guardian if applicable) is aware that this is a billable service, which includes applicable co-pays. This visit was conducted with the patient's (and/or legal guardian's) verbal consent. He has not had a related appointment within my department in the past 7 days or scheduled within the next 24 hours.   The patient was located at Home: [address] [place] [address].  The provider was located at Facility (Appt Dept): [address] [place] [address] Dr Lucio [address] [place] [phone].    Note: not billable if this call serves to triage the patient into an appointment for the relevant concern         Past Medical History, Past Surgical History, Family history, Social History, and Medications were all reviewed with the patient today and updated as necessary.       Current Outpatient Medications   Medication Sig Dispense Refill    testosterone (ANDROGEL) 20.25 MG/1.25GM (1.62%) GEL gel 2 pumps daily 88 g 5    amitriptyline (ELAVIL) 25 MG tablet TAKE 1-2 TABLETS AT BEDTIME 180 tablet 0    celecoxib (CELEBREX) 200 MG capsule Take 1 capsule by mouth daily 90 capsule 3     No current facility-administered medications for this visit.     No Known Allergies  Patient Active Problem List   Diagnosis    Insomnia    Urge urinary incontinence    Environmental and seasonal allergies     Past Medical History:   Diagnosis Date    COVID-19     cough, congestion; sinus pressure,

## 2024-06-01 DIAGNOSIS — G47.01 INSOMNIA DUE TO MEDICAL CONDITION: ICD-10-CM

## 2024-06-03 RX ORDER — AMITRIPTYLINE HYDROCHLORIDE 25 MG/1
TABLET, FILM COATED ORAL
Qty: 180 TABLET | Refills: 3 | Status: SHIPPED | OUTPATIENT
Start: 2024-06-03

## 2024-10-14 DIAGNOSIS — R79.89 LOW TESTOSTERONE IN MALE: ICD-10-CM

## 2024-10-14 RX ORDER — TESTOSTERONE 20.25 MG/1.25G
GEL TOPICAL
Qty: 88 G | Refills: 0 | Status: SHIPPED | OUTPATIENT
Start: 2024-10-14 | End: 2025-04-03

## 2024-10-14 NOTE — TELEPHONE ENCOUNTER
Schedule labs and physical the soonest we could get. Can we send 1 month supply? He is out and normally doesn't do this.

## 2024-11-01 ENCOUNTER — LAB (OUTPATIENT)
Dept: FAMILY MEDICINE CLINIC | Facility: CLINIC | Age: 49
End: 2024-11-01
Payer: COMMERCIAL

## 2024-11-01 DIAGNOSIS — E78.1 PURE HYPERTRIGLYCERIDEMIA: ICD-10-CM

## 2024-11-01 DIAGNOSIS — Z00.00 LABORATORY EXAMINATION ORDERED AS PART OF A ROUTINE GENERAL MEDICAL EXAMINATION: Primary | ICD-10-CM

## 2024-11-01 DIAGNOSIS — Z12.5 SPECIAL SCREENING FOR MALIGNANT NEOPLASM OF PROSTATE: ICD-10-CM

## 2024-11-01 DIAGNOSIS — R79.89 LOW TESTOSTERONE IN MALE: ICD-10-CM

## 2024-11-01 DIAGNOSIS — E55.9 VITAMIN D DEFICIENCY: ICD-10-CM

## 2024-11-01 LAB
25(OH)D3 SERPL-MCNC: 32.7 NG/ML (ref 30–100)
ALBUMIN SERPL-MCNC: 4 G/DL (ref 3.5–5)
ALBUMIN/GLOB SERPL: 1.5 (ref 1–1.9)
ALP SERPL-CCNC: 82 U/L (ref 40–129)
ALT SERPL-CCNC: 68 U/L (ref 8–55)
ANION GAP SERPL CALC-SCNC: 8 MMOL/L (ref 7–16)
AST SERPL-CCNC: 32 U/L (ref 15–37)
BILIRUB SERPL-MCNC: 0.4 MG/DL (ref 0–1.2)
BILIRUBIN, URINE, POC: NEGATIVE
BLOOD URINE, POC: NEGATIVE
BUN SERPL-MCNC: 18 MG/DL (ref 6–23)
CALCIUM SERPL-MCNC: 9.3 MG/DL (ref 8.8–10.2)
CHLORIDE SERPL-SCNC: 107 MMOL/L (ref 98–107)
CHOLEST SERPL-MCNC: 187 MG/DL (ref 0–200)
CO2 SERPL-SCNC: 25 MMOL/L (ref 20–29)
CREAT SERPL-MCNC: 0.97 MG/DL (ref 0.8–1.3)
GLOBULIN SER CALC-MCNC: 2.6 G/DL (ref 2.3–3.5)
GLUCOSE SERPL-MCNC: 102 MG/DL (ref 70–99)
GLUCOSE URINE, POC: NEGATIVE
GRANS ABSOLUTE, POC: 3.8 K/UL
GRANULOCYTES %, POC: 49.8 %
HDLC SERPL-MCNC: 47 MG/DL (ref 40–60)
HDLC SERPL: 4 (ref 0–5)
HEMATOCRIT, POC: 46.2 %
HEMOGLOBIN, POC: 16.2 G/DL
KETONES, URINE, POC: NEGATIVE
LDLC SERPL CALC-MCNC: 118 MG/DL (ref 0–100)
LEUKOCYTE ESTERASE, URINE, POC: NEGATIVE
LYMPHOCYTE %, POC: 41.2 %
LYMPHS ABSOLUTE, POC: 3.1 K/UL
MCH, POC: 31.3 PG (ref 20–?)
MCHC, POC: 35.1
MCV, POC: 89.4
MONOCYTE %, POC: 9 %
MONOCYTE, ABSOLUTE POC: 0.7 K/UL
MPV, POC: 7.8 FL
NITRITE, URINE, POC: NEGATIVE
PH, URINE, POC: 6 (ref 4.6–8)
PLATELET COUNT, POC: 304 K/UL
POTASSIUM SERPL-SCNC: 4.5 MMOL/L (ref 3.5–5.1)
PROT SERPL-MCNC: 6.6 G/DL (ref 6.3–8.2)
PROTEIN,URINE, POC: NEGATIVE
PSA SERPL-MCNC: 0.6 NG/ML (ref 0–4)
RBC, POC: 5.17 M/UL
RDW, POC: 12.7 %
SODIUM SERPL-SCNC: 140 MMOL/L (ref 136–145)
SPECIFIC GRAVITY, URINE, POC: 1.02 (ref 1–1.03)
TRIGL SERPL-MCNC: 111 MG/DL (ref 0–150)
TSH, 3RD GENERATION: 0.83 UIU/ML (ref 0.27–4.2)
URINALYSIS CLARITY, POC: CLEAR
URINALYSIS COLOR, POC: YELLOW
UROBILINOGEN, POC: NORMAL
VLDLC SERPL CALC-MCNC: 22 MG/DL (ref 6–23)
WBC, POC: 7.6 K/UL

## 2024-11-01 PROCEDURE — 81003 URINALYSIS AUTO W/O SCOPE: CPT | Performed by: FAMILY MEDICINE

## 2024-11-01 PROCEDURE — 85025 COMPLETE CBC W/AUTO DIFF WBC: CPT | Performed by: FAMILY MEDICINE

## 2024-11-03 LAB — TESTOST SERPL-MCNC: 382 NG/DL (ref 264–916)

## 2024-11-06 LAB
TESTOST FREE SERPL-MCNC: 7.7 PG/ML (ref 6.8–21.5)
TESTOST SERPL-MCNC: 382 NG/DL (ref 264–916)

## 2024-11-07 ASSESSMENT — PATIENT HEALTH QUESTIONNAIRE - PHQ9
2. FEELING DOWN, DEPRESSED OR HOPELESS: SEVERAL DAYS
SUM OF ALL RESPONSES TO PHQ9 QUESTIONS 1 & 2: 2
SUM OF ALL RESPONSES TO PHQ QUESTIONS 1-9: 2
SUM OF ALL RESPONSES TO PHQ QUESTIONS 1-9: 2
1. LITTLE INTEREST OR PLEASURE IN DOING THINGS: SEVERAL DAYS
1. LITTLE INTEREST OR PLEASURE IN DOING THINGS: SEVERAL DAYS
SUM OF ALL RESPONSES TO PHQ QUESTIONS 1-9: 2
SUM OF ALL RESPONSES TO PHQ9 QUESTIONS 1 & 2: 2
2. FEELING DOWN, DEPRESSED OR HOPELESS: SEVERAL DAYS
SUM OF ALL RESPONSES TO PHQ QUESTIONS 1-9: 2

## 2024-11-08 ENCOUNTER — OFFICE VISIT (OUTPATIENT)
Dept: FAMILY MEDICINE CLINIC | Facility: CLINIC | Age: 49
End: 2024-11-08
Payer: COMMERCIAL

## 2024-11-08 VITALS — BODY MASS INDEX: 25.01 KG/M2 | HEIGHT: 68 IN | WEIGHT: 165 LBS

## 2024-11-08 DIAGNOSIS — F41.9 ANXIETY: ICD-10-CM

## 2024-11-08 DIAGNOSIS — N52.9 ERECTILE DYSFUNCTION, UNSPECIFIED ERECTILE DYSFUNCTION TYPE: ICD-10-CM

## 2024-11-08 DIAGNOSIS — R79.89 LOW TESTOSTERONE IN MALE: ICD-10-CM

## 2024-11-08 DIAGNOSIS — R13.11 ORAL PHASE DYSPHAGIA: ICD-10-CM

## 2024-11-08 DIAGNOSIS — Z00.00 ROUTINE GENERAL MEDICAL EXAMINATION AT A HEALTH CARE FACILITY: Primary | ICD-10-CM

## 2024-11-08 DIAGNOSIS — G47.01 INSOMNIA DUE TO MEDICAL CONDITION: ICD-10-CM

## 2024-11-08 DIAGNOSIS — M19.90 ARTHRITIS: ICD-10-CM

## 2024-11-08 PROCEDURE — 99396 PREV VISIT EST AGE 40-64: CPT | Performed by: FAMILY MEDICINE

## 2024-11-08 RX ORDER — SILDENAFIL 100 MG/1
100 TABLET, FILM COATED ORAL PRN
Qty: 30 TABLET | Refills: 3 | Status: SHIPPED | OUTPATIENT
Start: 2024-11-08

## 2024-11-08 RX ORDER — CLONAZEPAM 0.5 MG/1
TABLET ORAL
Qty: 30 TABLET | Refills: 1 | Status: SHIPPED | OUTPATIENT
Start: 2024-11-08 | End: 2024-12-08

## 2024-11-08 RX ORDER — CELECOXIB 200 MG/1
200 CAPSULE ORAL DAILY
Qty: 90 CAPSULE | Refills: 3 | Status: SHIPPED | OUTPATIENT
Start: 2024-11-08

## 2024-11-08 RX ORDER — TESTOSTERONE 20.25 MG/1.25G
1 GEL TOPICAL DAILY
Qty: 150 G | Refills: 5 | Status: SHIPPED | OUTPATIENT
Start: 2024-11-08 | End: 2024-12-08

## 2024-11-08 RX ORDER — TESTOSTERONE 20.25 MG/1.25G
1 GEL TOPICAL DAILY
Qty: 150 G | Refills: 5 | Status: CANCELLED | OUTPATIENT
Start: 2024-11-08 | End: 2024-12-08

## 2024-11-08 RX ORDER — TESTOSTERONE 20.25 MG/1.25G
GEL TOPICAL DAILY
Status: CANCELLED | OUTPATIENT
Start: 2024-11-08

## 2024-11-08 RX ORDER — TESTOSTERONE 20.25 MG/1.25G
GEL TOPICAL DAILY
COMMUNITY
End: 2024-11-08 | Stop reason: SDUPTHER

## 2024-11-08 SDOH — ECONOMIC STABILITY: FOOD INSECURITY: WITHIN THE PAST 12 MONTHS, YOU WORRIED THAT YOUR FOOD WOULD RUN OUT BEFORE YOU GOT MONEY TO BUY MORE.: NEVER TRUE

## 2024-11-08 SDOH — ECONOMIC STABILITY: FOOD INSECURITY: WITHIN THE PAST 12 MONTHS, THE FOOD YOU BOUGHT JUST DIDN'T LAST AND YOU DIDN'T HAVE MONEY TO GET MORE.: NEVER TRUE

## 2024-11-08 SDOH — ECONOMIC STABILITY: INCOME INSECURITY: HOW HARD IS IT FOR YOU TO PAY FOR THE VERY BASICS LIKE FOOD, HOUSING, MEDICAL CARE, AND HEATING?: NOT HARD AT ALL

## 2024-11-08 ASSESSMENT — ENCOUNTER SYMPTOMS
SHORTNESS OF BREATH: 0
CONSTIPATION: 0
SORE THROAT: 0
WHEEZING: 0
COUGH: 0
VOMITING: 0
ABDOMINAL PAIN: 0
NAUSEA: 0
DIARRHEA: 0

## 2024-11-08 NOTE — PROGRESS NOTES
150 2.5G per pack each package is 25 mgml per box  1 packet       
g/dL    Globulin 2.6 2.3 - 3.5 g/dL    Albumin/Globulin Ratio 1.5 1.0 - 1.9     PSA Screening    Collection Time: 11/01/24  8:15 AM   Result Value Ref Range    PSA 0.6 0.0 - 4.0 ng/mL   AMB POC KTY COMPLETE CBC    Collection Time: 11/01/24  8:16 AM   Result Value Ref Range    WBC, POC 7.6 K/uL    Lymphocyte % 41.2 %    Monocyte % 9.0 %    Granulocytes %, POC 49.8 %    Lymphs Abs 3.1 K/uL    Monocyte Absolute, POC 0.7 K/uL    Granulocytes Abs 3.8 K/uL    RBC, POC 5.17 M/uL    Hemoglobin, POC 16.2 G/DL    Hematocrit, POC 46.2 %    MCV 89.4     MCH 31.3 20 pg    MCHC 35.1     RDW, POC 12.7 %    Platelet Count,  K/UL    MPV POC 7.8 fL   AMB POC URINALYSIS DIP STICK AUTO W/O MICRO    Collection Time: 11/01/24  8:17 AM   Result Value Ref Range    Color, Urine, POC Yellow     Clarity, Urine, POC Clear     Glucose, Urine, POC Negative     Bilirubin, Urine, POC Negative     Ketones, Urine, POC Negative     Specific Gravity, Urine, POC 1.020 1.001 - 1.035    Blood, Urine, POC Negative Negative    pH, Urine, POC 6.0 4.6 - 8.0    Protein, Urine, POC Negative     Urobilinogen, POC Normal     Nitrite, Urine, POC Negative     Leukocyte Esterase, Urine, POC Negative        ASSESSMENT and PLAN    Visit Diagnoses and Associated Orders       Routine general medical examination at a health care facility    -  Primary         Insomnia due to medical condition        amitriptyline (ELAVIL) 25 MG tablet [435]           Arthritis        celecoxib (CELEBREX) 200 MG capsule [78032]           Low testosterone in male        testosterone (ANDROGEL) 20.25 MG/1.25GM (1.62%) GEL gel [633787]           Erectile dysfunction, unspecified erectile dysfunction type        sildenafil (VIAGRA) 100 MG tablet [61904]           Anxiety        clonazePAM (KLONOPIN) 0.5 MG tablet [9637]           Oral phase dysphagia        [facility] [REF25 Custom]                       Diagnosis Orders   1. Routine general

## 2024-11-08 NOTE — ADDENDUM NOTE
Addended by: SATURNINO STALEY on: 11/8/2024 04:58 PM     Modules accepted: Orders, Level of Service

## 2025-03-03 ENCOUNTER — OFFICE VISIT (OUTPATIENT)
Age: 50
End: 2025-03-03
Payer: COMMERCIAL

## 2025-03-03 VITALS
SYSTOLIC BLOOD PRESSURE: 134 MMHG | HEIGHT: 68 IN | HEART RATE: 99 BPM | WEIGHT: 170 LBS | BODY MASS INDEX: 25.76 KG/M2 | DIASTOLIC BLOOD PRESSURE: 84 MMHG | OXYGEN SATURATION: 97 %

## 2025-03-03 DIAGNOSIS — K21.9 GASTROESOPHAGEAL REFLUX DISEASE, UNSPECIFIED WHETHER ESOPHAGITIS PRESENT: ICD-10-CM

## 2025-03-03 DIAGNOSIS — R13.19 ESOPHAGEAL DYSPHAGIA: Primary | ICD-10-CM

## 2025-03-03 PROCEDURE — 99204 OFFICE O/P NEW MOD 45 MIN: CPT | Performed by: PHYSICIAN ASSISTANT

## 2025-03-03 RX ORDER — OMEPRAZOLE 20 MG/1
20 CAPSULE, DELAYED RELEASE ORAL
Qty: 180 CAPSULE | Refills: 0 | Status: SHIPPED | OUTPATIENT
Start: 2025-03-03

## 2025-03-03 NOTE — PROGRESS NOTES
Aurelio Dukes (:  1975) is a 49 y.o. male new patient referred to our office for evaluation of the following chief complaint(s):  New Patient        Assessment & Plan   ASSESSMENT/PLAN:  1. Esophageal dysphagia  2. Gastroesophageal reflux disease, unspecified whether esophagitis present  -     omeprazole (PRILOSEC) 20 MG delayed release capsule; Take 1 capsule by mouth 2 times daily (before meals), Disp-180 capsule, R-0Normal      Assessment & Plan    -Counseled patient and provided written recommendations for diet and lifestyle modifications for GERD. Avoid tobacco, alcohol, NSAIDs. Offered EGD/dilation but he wishes to pursue medication trial initially. Start Prilosec 20 mg BID AC. If no improvement in 3-4 weeks he should call and we can maximize dosing to 40 mg BID AC. If no improvement at follow-up in 3 months will plan for EGD evaluation.    Results      Return for scheduled EGD/dilation.         Subjective   SUBJECTIVE/OBJECTIVE  Aurelio Dukes is a 49 y.o. year old male referred to our office for evaluation of dysphagia. Referral note reviewed from 2024.  Prior pertinent GI evaluation includes:    -Colonoscopy 2023 (Dr. Gautam Bright): Good prep, normal colonoscopy; recall 10 years    History of Present Illness    He reports a many year hx of intermittent solid food and large pill dysphagia. Drinks some water or massages the esophagus to help pass. Sometimes experiences regurgitation. Sometimes has sensation of gas and needing to belch in the chest and shoulders. Has frequent belching. Has some regurgitation without burning. Denies indigestion, heartburn. Has some occasional upper abdominal discomfort and bloating. Denies constipation, diarrhea, melena, hematochezia.     He reports several other family members with issues with dysphagia requiring dilations. Patient denies family hx of GI malignancy or IBD. Denies hx tobacco. Drinks 2-3 drinks daily, a mix of beer, wine, liquor. Denies

## 2025-04-04 ENCOUNTER — TELEPHONE (OUTPATIENT)
Dept: FAMILY MEDICINE CLINIC | Facility: CLINIC | Age: 50
End: 2025-04-04

## 2025-04-04 DIAGNOSIS — R79.89 LOW TESTOSTERONE IN MALE: ICD-10-CM

## 2025-04-04 RX ORDER — TESTOSTERONE 20.25 MG/1.25G
1 GEL TOPICAL DAILY
Qty: 150 G | Refills: 5 | Status: SHIPPED | OUTPATIENT
Start: 2025-04-04 | End: 2025-05-04

## 2025-04-04 RX ORDER — TESTOSTERONE 20.25 MG/1.25G
GEL TOPICAL
OUTPATIENT
Start: 2025-04-04

## 2025-05-07 DIAGNOSIS — R79.89 LOW TESTOSTERONE IN MALE: ICD-10-CM

## 2025-05-07 DIAGNOSIS — Z12.5 SPECIAL SCREENING FOR MALIGNANT NEOPLASM OF PROSTATE: ICD-10-CM

## 2025-05-07 DIAGNOSIS — Z79.899 ENCOUNTER FOR LONG-TERM (CURRENT) USE OF MEDICATIONS: Primary | ICD-10-CM

## 2025-05-16 ENCOUNTER — LAB (OUTPATIENT)
Dept: FAMILY MEDICINE CLINIC | Facility: CLINIC | Age: 50
End: 2025-05-16

## 2025-05-16 DIAGNOSIS — Z12.5 SPECIAL SCREENING FOR MALIGNANT NEOPLASM OF PROSTATE: ICD-10-CM

## 2025-05-16 DIAGNOSIS — Z79.899 ENCOUNTER FOR LONG-TERM (CURRENT) USE OF MEDICATIONS: Primary | ICD-10-CM

## 2025-05-16 DIAGNOSIS — R79.89 LOW TESTOSTERONE IN MALE: ICD-10-CM

## 2025-05-16 LAB
BASOPHILS # BLD: 0.07 K/UL (ref 0–0.2)
BASOPHILS NFR BLD: 1 % (ref 0–2)
DIFFERENTIAL METHOD BLD: NORMAL
EOSINOPHIL # BLD: 0.34 K/UL (ref 0–0.8)
EOSINOPHIL NFR BLD: 4.9 % (ref 0.5–7.8)
ERYTHROCYTE [DISTWIDTH] IN BLOOD BY AUTOMATED COUNT: 12.4 % (ref 11.9–14.6)
HCT VFR BLD AUTO: 46.1 % (ref 41.1–50.3)
HGB BLD-MCNC: 16.1 G/DL (ref 13.6–17.2)
IMM GRANULOCYTES # BLD AUTO: 0.02 K/UL (ref 0–0.5)
IMM GRANULOCYTES NFR BLD AUTO: 0.3 % (ref 0–5)
LYMPHOCYTES # BLD: 2.4 K/UL (ref 0.5–4.6)
LYMPHOCYTES NFR BLD: 34.9 % (ref 13–44)
MCH RBC QN AUTO: 29.9 PG (ref 26.1–32.9)
MCHC RBC AUTO-ENTMCNC: 34.9 G/DL (ref 31.4–35)
MCV RBC AUTO: 85.5 FL (ref 82–102)
MONOCYTES # BLD: 0.6 K/UL (ref 0.1–1.3)
MONOCYTES NFR BLD: 8.7 % (ref 4–12)
NEUTS SEG # BLD: 3.44 K/UL (ref 1.7–8.2)
NEUTS SEG NFR BLD: 50.2 % (ref 43–78)
NRBC # BLD: 0 K/UL (ref 0–0.2)
PLATELET # BLD AUTO: 246 K/UL (ref 150–450)
PMV BLD AUTO: 10.9 FL (ref 9.4–12.3)
PSA SERPL-MCNC: 0.6 NG/ML (ref 0–4)
RBC # BLD AUTO: 5.39 M/UL (ref 4.23–5.6)
WBC # BLD AUTO: 6.9 K/UL (ref 4.3–11.1)

## 2025-05-17 LAB — TESTOST SERPL-MCNC: 394 NG/DL (ref 264–916)

## 2025-05-19 LAB
TESTOST FREE SERPL-MCNC: 7.2 PG/ML (ref 6.8–21.5)
TESTOST SERPL-MCNC: 394 NG/DL (ref 264–916)

## 2025-05-23 ENCOUNTER — TELEMEDICINE (OUTPATIENT)
Dept: FAMILY MEDICINE CLINIC | Facility: CLINIC | Age: 50
End: 2025-05-23
Payer: COMMERCIAL

## 2025-05-23 ENCOUNTER — TELEPHONE (OUTPATIENT)
Dept: FAMILY MEDICINE CLINIC | Facility: CLINIC | Age: 50
End: 2025-05-23

## 2025-05-23 DIAGNOSIS — G47.00 INSOMNIA, UNSPECIFIED TYPE: Primary | ICD-10-CM

## 2025-05-23 DIAGNOSIS — F41.9 ANXIETY: ICD-10-CM

## 2025-05-23 DIAGNOSIS — R79.89 LOW TESTOSTERONE IN MALE: ICD-10-CM

## 2025-05-23 PROCEDURE — 99214 OFFICE O/P EST MOD 30 MIN: CPT | Performed by: FAMILY MEDICINE

## 2025-05-23 RX ORDER — TESTOSTERONE ENANTHATE 200 MG/ML
50 INJECTION, SOLUTION INTRAMUSCULAR ONCE
Status: CANCELLED | OUTPATIENT
Start: 2025-05-23 | End: 2025-05-23

## 2025-05-23 RX ORDER — CLONAZEPAM 0.5 MG/1
TABLET ORAL
Qty: 30 TABLET | Refills: 1 | Status: SHIPPED | OUTPATIENT
Start: 2025-05-23 | End: 2025-06-22

## 2025-05-23 RX ORDER — ZOLPIDEM TARTRATE 10 MG/1
10 TABLET ORAL NIGHTLY PRN
Qty: 14 TABLET | Refills: 0 | Status: SHIPPED | OUTPATIENT
Start: 2025-05-23 | End: 2025-06-06

## 2025-05-23 SDOH — ECONOMIC STABILITY: FOOD INSECURITY: WITHIN THE PAST 12 MONTHS, THE FOOD YOU BOUGHT JUST DIDN'T LAST AND YOU DIDN'T HAVE MONEY TO GET MORE.: NEVER TRUE

## 2025-05-23 SDOH — ECONOMIC STABILITY: TRANSPORTATION INSECURITY
IN THE PAST 12 MONTHS, HAS THE LACK OF TRANSPORTATION KEPT YOU FROM MEDICAL APPOINTMENTS OR FROM GETTING MEDICATIONS?: NO

## 2025-05-23 SDOH — ECONOMIC STABILITY: FOOD INSECURITY: WITHIN THE PAST 12 MONTHS, YOU WORRIED THAT YOUR FOOD WOULD RUN OUT BEFORE YOU GOT MONEY TO BUY MORE.: NEVER TRUE

## 2025-05-23 SDOH — ECONOMIC STABILITY: INCOME INSECURITY: IN THE LAST 12 MONTHS, WAS THERE A TIME WHEN YOU WERE NOT ABLE TO PAY THE MORTGAGE OR RENT ON TIME?: NO

## 2025-05-23 SDOH — ECONOMIC STABILITY: TRANSPORTATION INSECURITY
IN THE PAST 12 MONTHS, HAS LACK OF TRANSPORTATION KEPT YOU FROM MEETINGS, WORK, OR FROM GETTING THINGS NEEDED FOR DAILY LIVING?: NO

## 2025-05-23 ASSESSMENT — PATIENT HEALTH QUESTIONNAIRE - PHQ9
SUM OF ALL RESPONSES TO PHQ QUESTIONS 1-9: 0
1. LITTLE INTEREST OR PLEASURE IN DOING THINGS: NOT AT ALL
2. FEELING DOWN, DEPRESSED OR HOPELESS: NOT AT ALL
SUM OF ALL RESPONSES TO PHQ QUESTIONS 1-9: 0

## 2025-05-23 ASSESSMENT — ENCOUNTER SYMPTOMS
NAUSEA: 0
VOMITING: 0
SHORTNESS OF BREATH: 0

## 2025-05-23 NOTE — PROGRESS NOTES
tablet by mouth nightly as needed for Sleep for up to 14 days. Max Daily Amount: 10 mg 14 tablet 0    testosterone (ANDROGEL) 20.25 MG/1.25GM (1.62%) GEL gel Place 1 Package onto the skin daily for 30 days. Max Daily Amount: 1 Package 150 g 5    omeprazole (PRILOSEC) 20 MG delayed release capsule Take 1 capsule by mouth 2 times daily (before meals) 180 capsule 0    amitriptyline (ELAVIL) 25 MG tablet TAKE 1-2 TABLETS BY MOUTH AT BEDTIME 180 tablet 3    celecoxib (CELEBREX) 200 MG capsule Take 1 capsule by mouth daily 90 capsule 3    sildenafil (VIAGRA) 100 MG tablet Take 1 tablet by mouth as needed for Erectile Dysfunction 30 tablet 3     No current facility-administered medications for this visit.     No Known Allergies  Patient Active Problem List   Diagnosis    Insomnia    Urge urinary incontinence    Environmental and seasonal allergies     Past Medical History:   Diagnosis Date    COVID-19     cough, congestion; sinus pressure, fatigue    Insomnia     Irritable bowel syndrome      Past Surgical History:   Procedure Laterality Date    COLONOSCOPY N/A 11/17/2023    COLORECTAL CANCER SCREENING, NOT HIGH RISK/ BMI 24 performed by Carole Garner MD at [facility]    TURP  05/2016    VASECTOMY       Family History   Problem Relation Age of Onset    No Known Problems Father     Thyroid Disease Mother     Asthma Mother     Liver Disease Brother      Social History     Tobacco Use    Smoking status: Never     Passive exposure: Never    Smokeless tobacco: Never   Substance Use Topics    Alcohol use: Yes     Alcohol/week: 12.0 standard drinks of alcohol     Types: 2 Glasses of wine, 5 Cans of beer, 3 Shots of liquor, 2 Drinks containing 0.5 oz of alcohol per week     Comment: Always spaced out throughout the day         Review of Systems   Constitutional:  Negative for fatigue and fever.   Respiratory:  Negative for shortness of breath.    Cardiovascular:  Negative for chest pain.   Gastrointestinal:

## 2025-05-23 NOTE — TELEPHONE ENCOUNTER
Called and asked the pharmacy about the depo and enanthate testosterone and both shots. Alternative gels are testim and vogelxo. Told patient to call pharmacy and ask if those are covered.

## 2025-06-05 DIAGNOSIS — R79.89 LOW TESTOSTERONE IN MALE: ICD-10-CM

## 2025-06-05 RX ORDER — TESTOSTERONE 20.25 MG/1.25G
1 GEL TOPICAL DAILY
Qty: 150 G | Refills: 5 | Status: SHIPPED | OUTPATIENT
Start: 2025-06-05 | End: 2025-07-05

## 2025-06-06 ENCOUNTER — TELEPHONE (OUTPATIENT)
Dept: FAMILY MEDICINE CLINIC | Facility: CLINIC | Age: 50
End: 2025-06-06

## 2025-06-06 ENCOUNTER — CLINICAL DOCUMENTATION (OUTPATIENT)
Dept: FAMILY MEDICINE CLINIC | Facility: CLINIC | Age: 50
End: 2025-06-06

## 2025-06-06 NOTE — TELEPHONE ENCOUNTER
I called patient to remind him to call our office once he starts his new testosterone medication to schedule labs and follow up six weeks from there.

## 2025-06-11 ENCOUNTER — TELEPHONE (OUTPATIENT)
Dept: FAMILY MEDICINE CLINIC | Facility: CLINIC | Age: 50
End: 2025-06-11

## 2025-07-24 DIAGNOSIS — R79.89 LOW TESTOSTERONE IN MALE: ICD-10-CM

## 2025-07-24 RX ORDER — TESTOSTERONE 20.25 MG/1.25G
GEL TOPICAL
Qty: 88 G | Refills: 5 | Status: SHIPPED | OUTPATIENT
Start: 2025-07-24 | End: 2026-01-11

## 2025-07-24 RX ORDER — TESTOSTERONE 20.25 MG/1.25G
GEL TOPICAL
Qty: 88 G | Refills: 5 | Status: CANCELLED | OUTPATIENT
Start: 2025-07-24 | End: 2026-01-11

## 2025-07-24 NOTE — TELEPHONE ENCOUNTER
Testosterone called into wrong pharmacy. Canceled script at [facility]. Having trouble finding [facility] in our system. We have sent it there before, but it will not pull back up. I tried calling this pharmacy and no one answered, so I left a message requesting a call back. Patient notified that we are canceling prescription at [facility] and sending it into [facility].    Agatha called and states

## 2025-07-25 DIAGNOSIS — R79.89 LOW TESTOSTERONE IN MALE: ICD-10-CM

## 2025-07-25 RX ORDER — TESTOSTERONE 20.25 MG/1.25G
GEL TOPICAL
Qty: 88 G | Refills: 5 | Status: SHIPPED | OUTPATIENT
Start: 2025-07-25 | End: 2026-01-12

## 2025-07-25 NOTE — TELEPHONE ENCOUNTER
Citlaly do you mind sending this in? We sent to wrong pharmacy at first. I called and canceled it at [facility].

## 2025-07-30 ENCOUNTER — LAB (OUTPATIENT)
Dept: FAMILY MEDICINE CLINIC | Facility: CLINIC | Age: 50
End: 2025-07-30
Payer: COMMERCIAL

## 2025-07-30 DIAGNOSIS — R79.89 LOW TESTOSTERONE IN MALE: ICD-10-CM

## 2025-07-30 DIAGNOSIS — R79.89 LOW TESTOSTERONE IN MALE: Primary | ICD-10-CM

## 2025-07-30 DIAGNOSIS — Z79.899 ENCOUNTER FOR LONG-TERM (CURRENT) USE OF MEDICATIONS: Primary | ICD-10-CM

## 2025-07-30 DIAGNOSIS — Z79.899 ENCOUNTER FOR LONG-TERM (CURRENT) USE OF MEDICATIONS: ICD-10-CM

## 2025-07-30 PROCEDURE — 36415 COLL VENOUS BLD VENIPUNCTURE: CPT | Performed by: FAMILY MEDICINE

## 2025-08-02 LAB
TESTOST FREE SERPL-MCNC: 9.2 PG/ML (ref 6.8–21.5)
TESTOST SERPL-MCNC: 485 NG/DL (ref 264–916)

## 2025-08-11 ENCOUNTER — OFFICE VISIT (OUTPATIENT)
Age: 50
End: 2025-08-11
Payer: COMMERCIAL

## 2025-08-11 ENCOUNTER — TELEPHONE (OUTPATIENT)
Age: 50
End: 2025-08-11

## 2025-08-11 VITALS
DIASTOLIC BLOOD PRESSURE: 90 MMHG | SYSTOLIC BLOOD PRESSURE: 136 MMHG | HEART RATE: 106 BPM | OXYGEN SATURATION: 96 % | RESPIRATION RATE: 17 BRPM | HEIGHT: 68 IN | BODY MASS INDEX: 25.91 KG/M2 | WEIGHT: 171 LBS

## 2025-08-11 DIAGNOSIS — K21.9 GASTROESOPHAGEAL REFLUX DISEASE, UNSPECIFIED WHETHER ESOPHAGITIS PRESENT: ICD-10-CM

## 2025-08-11 DIAGNOSIS — R13.19 ESOPHAGEAL DYSPHAGIA: Primary | ICD-10-CM

## 2025-08-11 PROCEDURE — 99213 OFFICE O/P EST LOW 20 MIN: CPT | Performed by: PHYSICIAN ASSISTANT

## 2025-08-11 RX ORDER — OMEPRAZOLE 20 MG/1
20 CAPSULE, DELAYED RELEASE ORAL
Qty: 180 CAPSULE | Refills: 0 | Status: SHIPPED | OUTPATIENT
Start: 2025-08-11

## (undated) DEVICE — AIRLIFE™ OXYGEN TUBING 7 FEET (2.1 M) CRUSH RESISTANT OXYGEN TUBING, VINYL TIPPED: Brand: AIRLIFE™

## (undated) DEVICE — NEEDLE SYR 18GA L1.5IN RED PLAS HUB S STL BLNT FILL W/O

## (undated) DEVICE — SINGLE PORT MANIFOLD: Brand: NEPTUNE 2

## (undated) DEVICE — KENDALL RADIOLUCENT FOAM MONITORING ELECTRODE RECTANGULAR SHAPE: Brand: KENDALL

## (undated) DEVICE — GAUZE,SPONGE,4"X4",12PLY,WOVEN,NS,LF: Brand: MEDLINE

## (undated) DEVICE — SYRINGE, LUER SLIP, STERILE, 60ML: Brand: MEDLINE

## (undated) DEVICE — SYRINGE MED 3ML CLR PLAS STD N CTRL LUERLOCK TIP DISP

## (undated) DEVICE — ENDOSCOPIC KIT 1.1+ OP4 CA DE 2 GWN AAMI LEVEL 3

## (undated) DEVICE — CONNECTOR TBNG OD5-7MM O2 END DISP

## (undated) DEVICE — LUBE JELLY FOIL PACK 1.4 OZ: Brand: MEDLINE INDUSTRIES, INC.

## (undated) DEVICE — CANNULA NSL ORAL AD FOR CAPNOFLEX CO2 O2 AIRLFE

## (undated) DEVICE — SYRINGE MED 10ML LUERLOCK TIP W/O SFTY DISP

## (undated) DEVICE — YANKAUER,BULB TIP,W/O VENT,RIGID,STERILE: Brand: MEDLINE